# Patient Record
(demographics unavailable — no encounter records)

---

## 2025-02-25 NOTE — REASON FOR VISIT
[Initial Evaluation] : an initial evaluation [Patient] : patient [Mother] : mother [FreeTextEntry1] : Lower back pain

## 2025-02-25 NOTE — END OF VISIT
[FreeTextEntry3] : A physician assistant/resident assisted with documenting the visit and acted as a scribe. I have seen and examined the patient, made my assessment and plan and have made all modifications necessary to the note.  Jill Beltre MD Pediatric Orthopaedics Surgery [facility]

## 2025-02-25 NOTE — PHYSICAL EXAM
[FreeTextEntry1] : Pleasant and cooperative with exam, appropriate for age. Ambulates without evidence of antalgia and limp, good coordination and balance. AAOX3  Skin: No rashes noted.  Eyes: Both conjunctiva, eyelids and pupils are present.  ENT:  Both ears, nose and lips are present. No nasal congestion.  Resp: No cough or wheezing noted.  Lower back:  FAROM with mild left sided paraspinal muscles pain. No midline pain. Full side bending or rotations with no pack pain. Mild postural kyphosis, however this corrects with back extension.   Bilateral hips: FAROM with no significant stiffness. The joint is stable with stress maneuvers.   Bilateral upper and lower extremities: Clinically well aligned, no evidence of deformity. Full active and passive range of motion with 5/5 muscle strength. Symmetric and brisk DTRs. Capillary refill less than 2 seconds. Neurologically intact with full sensation to palpation. The joint is stable with stress maneuvers. No edema/lymphedema. No clubbing or contractures of the fingers or toes. Digits appear to be of normal length.

## 2025-02-25 NOTE — END OF VISIT
[FreeTextEntry3] : A physician assistant/resident assisted with documenting the visit and acted as a scribe. I have seen and examined the patient, made my assessment and plan and have made all modifications necessary to the note.  Jill Beltre MD Pediatric Orthopaedics Surgery [facility]

## 2025-02-25 NOTE — DATA REVIEWED
[de-identified] : PA Scoliosis x rays were ordered, done and then independently reviewed today:  No scoliosis noted.  The triradiate cartilage is closed. No congenital abnormalities. No Pelvic obliquity.   Lat Scoliosis x rays were ordered, done and then independently reviewed today: Increased lordotic curvature noted. + thoracolumbar kyphosis noted. No Scheuermann's kyphosis noted. No spondylolisthesis or spondylolysis. No compression fractures or vertebral wedging.

## 2025-02-25 NOTE — DATA REVIEWED
[de-identified] : PA Scoliosis x rays were ordered, done and then independently reviewed today:  No scoliosis noted.  The triradiate cartilage is closed. No congenital abnormalities. No Pelvic obliquity.   Lat Scoliosis x rays were ordered, done and then independently reviewed today: Increased lordotic curvature noted. + thoracolumbar kyphosis noted. No Scheuermann's kyphosis noted. No spondylolisthesis or spondylolysis. No compression fractures or vertebral wedging.

## 2025-02-25 NOTE — ASSESSMENT
[FreeTextEntry1] : Chata is a 16-year-old girl who has muscular lower back pain with increased lumbar lordosis, thoracolumbar kyphosis and poor posture. Today's assessment was performed with the assistance of the patient's parent as an independent historian as the patient's history is unreliable. The radiographs obtained today were reviewed with both the parent and patient confirming no scoliosis. No fractures.  Clinically her neurologic exam is normal. She has low back pain near the LS region, not around the TL region. The recommendation at this time would be to home stretching and a course of P.T. She will follow up 3 months for a repeat examination,  xrays and if there is no improvement and MRI may be warranted.   At followup visit the patient will get PA/lateral scoliosis x-rays  We had a thorough talk in regard to the diagnosis, prognosis and treatment modalities.  All questions and concerns were addressed today. There was a verbal understanding from the parents and patient.   ESME Ch have acted as a scribe and documented the above information for Dr. Beltre.   This note was generated using Dragon medical dictation software. A reasonable effort has been made for proofreading its contents; however, typos may still remain. If there are any questions or points of clarification needed, please do not hesitate to contact my office.   The above documentation completed by the scribe is an accurate record of both my words and actions.   Dr. Beltre

## 2025-02-25 NOTE — HISTORY OF PRESENT ILLNESS
[FreeTextEntry1] : Chata is a 16-year-old girl who has chronic lower back pain for several years with no history of injury. She denies any radiating pain down her legs. No urinary or bowl incontinence. The patient was referred here today for a pediatric orthopedic consultation.